# Patient Record
Sex: MALE
[De-identification: names, ages, dates, MRNs, and addresses within clinical notes are randomized per-mention and may not be internally consistent; named-entity substitution may affect disease eponyms.]

---

## 2023-12-22 ENCOUNTER — DASHBOARD ENCOUNTERS (OUTPATIENT)
Age: 53
End: 2023-12-22

## 2023-12-27 ENCOUNTER — OFFICE VISIT (OUTPATIENT)
Dept: URBAN - METROPOLITAN AREA CLINIC 92 | Facility: CLINIC | Age: 53
End: 2023-12-27

## 2023-12-27 RX ORDER — HYDROXYZINE HYDROCHLORIDE 25 MG/1
TABLET ORAL
Qty: 60 TABLET | Status: ACTIVE | COMMUNITY

## 2023-12-27 RX ORDER — HYDROCHLOROTHIAZIDE 25 MG/1
TABLET ORAL
Qty: 30 UNSPECIFIED | Status: ACTIVE | COMMUNITY

## 2023-12-27 RX ORDER — METFORMIN HYDROCHLORIDE 500 MG/1
TABLET ORAL
Qty: 30 UNSPECIFIED | Status: ACTIVE | COMMUNITY

## 2023-12-27 RX ORDER — ATORVASTATIN CALCIUM 20 MG/1
TAKE 1 TABLET BY MOUTH ONCE DAILY TABLET, FILM COATED ORAL
Qty: 30 EACH | Refills: 3 | Status: ACTIVE | COMMUNITY

## 2023-12-27 RX ORDER — TADALAFIL 20 MG/1
TABLET ORAL
Qty: 6 TABLET | Status: ACTIVE | COMMUNITY

## 2023-12-27 RX ORDER — CEFDINIR 300 MG/1
TAKE ONE CAPSULE BY MOUTH TWICE A DAY FOR 10 DAYS CAPSULE ORAL
Qty: 20 UNSPECIFIED | Refills: 0 | Status: ACTIVE | COMMUNITY

## 2023-12-27 RX ORDER — IBUPROFEN 800 MG/1
TAKE ONE TABLET BY MOUTH EVERY 8 HOURS AS NEEDED WITH FOOD OR MILK FOR 7 DAYS TABLET, FILM COATED ORAL
Qty: 21 UNSPECIFIED | Refills: 0 | Status: ACTIVE | COMMUNITY

## 2023-12-27 NOTE — HPI-TODAY'S VISIT:
This patient was referred by Dr. Ale Matthews for an evaluation of colon cancer screening.  A copy of this will be sent to the referring provider.  Last colonoscopy. There is no familyhistory of colon polyps or colon cancer. Patient denies a change in bowelhabits, appetite, and weight. Patient denies abdominal pain, constipation,diarrhea, hematochezia, or melena. Denies upper GI issues. Denies NSAID use. Patientdenies any cardiac, lung, or kidney issues. No pacemaker, No blood thinner, [place] O2.